# Patient Record
Sex: MALE | Race: WHITE | NOT HISPANIC OR LATINO | URBAN - METROPOLITAN AREA
[De-identification: names, ages, dates, MRNs, and addresses within clinical notes are randomized per-mention and may not be internally consistent; named-entity substitution may affect disease eponyms.]

---

## 2018-11-27 ENCOUNTER — OFFICE (OUTPATIENT)
Dept: URBAN - METROPOLITAN AREA CLINIC 33 | Facility: CLINIC | Age: 38
End: 2018-11-27

## 2018-11-27 VITALS
WEIGHT: 142 LBS | HEIGHT: 67 IN | DIASTOLIC BLOOD PRESSURE: 79 MMHG | SYSTOLIC BLOOD PRESSURE: 126 MMHG | TEMPERATURE: 97.5 F | HEART RATE: 87 BPM

## 2018-11-27 DIAGNOSIS — R10.12 LEFT UPPER QUADRANT PAIN: ICD-10-CM

## 2018-11-27 PROCEDURE — 99244 OFF/OP CNSLTJ NEW/EST MOD 40: CPT

## 2018-11-27 NOTE — SERVICEHPINOTES
Mr. Mclean is a 38 yr old male here to discuss abdominal pain. Symptoms first began about 10 months ago. At first was intermittent but in May, pain became more continuous. No triggers to his symptoms and no alleviating factors. Pain can radiate to the RUQ and can feel inflamed at times. He has tried stool softener (incase he had a blockage as well as gas x which haven't helped). He hasn't tried any PPIS. He doesn't drink regular ETOH and he is a former smoker. No NSAID use. H pylori breath test was negative.   No changes in his appetite no early satiety, nausea, vomiting, and no black stool. He notes occasional heartburn/reflux. Weight is stable. No family hx of GI issues. Stools are regular, not hard to pass and he notes complete evacuation of stool. He takes daily Percocet for back pain. No other GI related complaints today.

## 2018-12-06 ENCOUNTER — OFFICE (OUTPATIENT)
Dept: URBAN - METROPOLITAN AREA PATHOLOGY 17 | Facility: PATHOLOGY | Age: 38
End: 2018-12-06

## 2018-12-06 ENCOUNTER — OFFICE (OUTPATIENT)
Dept: URBAN - METROPOLITAN AREA CLINIC 32 | Facility: CLINIC | Age: 38
End: 2018-12-06

## 2018-12-06 VITALS
HEART RATE: 77 BPM | OXYGEN SATURATION: 100 % | DIASTOLIC BLOOD PRESSURE: 66 MMHG | OXYGEN SATURATION: 99 % | HEART RATE: 96 BPM | SYSTOLIC BLOOD PRESSURE: 138 MMHG | HEART RATE: 81 BPM | TEMPERATURE: 97.6 F | SYSTOLIC BLOOD PRESSURE: 116 MMHG | HEART RATE: 78 BPM | RESPIRATION RATE: 16 BRPM | DIASTOLIC BLOOD PRESSURE: 76 MMHG | DIASTOLIC BLOOD PRESSURE: 88 MMHG | OXYGEN SATURATION: 97 % | OXYGEN SATURATION: 95 % | SYSTOLIC BLOOD PRESSURE: 127 MMHG | SYSTOLIC BLOOD PRESSURE: 117 MMHG | SYSTOLIC BLOOD PRESSURE: 134 MMHG | DIASTOLIC BLOOD PRESSURE: 93 MMHG | WEIGHT: 142 LBS | HEIGHT: 67 IN | TEMPERATURE: 99.3 F | DIASTOLIC BLOOD PRESSURE: 89 MMHG | RESPIRATION RATE: 15 BRPM

## 2018-12-06 DIAGNOSIS — R10.13 EPIGASTRIC PAIN: ICD-10-CM

## 2018-12-06 DIAGNOSIS — K29.60 OTHER GASTRITIS WITHOUT BLEEDING: ICD-10-CM

## 2018-12-06 DIAGNOSIS — K21.0 GASTRO-ESOPHAGEAL REFLUX DISEASE WITH ESOPHAGITIS: ICD-10-CM

## 2018-12-06 DIAGNOSIS — R10.12 LEFT UPPER QUADRANT PAIN: ICD-10-CM

## 2018-12-06 PROBLEM — K44.9 DIAPHRAGMATIC HERNIA WITHOUT OBSTRUCTION OR GANGRENE: Status: ACTIVE | Noted: 2018-12-06

## 2018-12-06 PROBLEM — K31.89 OTHER DISEASES OF STOMACH AND DUODENUM: Status: ACTIVE | Noted: 2018-12-06

## 2018-12-06 PROBLEM — R10.32 ABDOMINAL PAIN, LLQ: Status: ACTIVE | Noted: 2018-12-06

## 2018-12-06 PROCEDURE — 88305 TISSUE EXAM BY PATHOLOGIST: CPT

## 2018-12-06 PROCEDURE — 88342 IMHCHEM/IMCYTCHM 1ST ANTB: CPT

## 2018-12-06 PROCEDURE — 88313 SPECIAL STAINS GROUP 2: CPT

## 2018-12-06 PROCEDURE — 43239 EGD BIOPSY SINGLE/MULTIPLE: CPT
